# Patient Record
(demographics unavailable — no encounter records)

---

## 2025-07-28 NOTE — HISTORY OF PRESENT ILLNESS
[FreeTextEntry1] : RENAY OJEDA is a 54 year old patient who presents for evaluation of osteoma of SKULL  Problem:  Location:   Duration:   Seen by Dermatology:   No previous treatments  No itching/ bleeding/drainage  No pain or discomfort  No imaging/Biopsy  Increasing in size over time  No infection or inflammation  No personal history of skin cancer

## 2025-07-28 NOTE — ASSESSMENT
[FreeTextEntry1] : PE:  Const: Awake, alert, oriented Eyes: EOMI, vision intact, sclera without erythema or injection Resp: Even , unlabored. no increased WOB. No cough appreciated Breasts: NA Neck: no JVD Neuro: Nl Tone, no tenderness,   Psych: Nl Affect  Skin:    A/P;   osteoma of skull.    cm x cm    no neurological s/s likely in office procedure ?? Plan for excision and biopsy with local anesthesia Discussed surgical plan including risks, benefits and alternatives to procedure. Questions and concerns addressed to satisfaction. RTC prn procedure